# Patient Record
Sex: FEMALE | Race: WHITE | Employment: FULL TIME | URBAN - METROPOLITAN AREA
[De-identification: names, ages, dates, MRNs, and addresses within clinical notes are randomized per-mention and may not be internally consistent; named-entity substitution may affect disease eponyms.]

---

## 2020-02-24 ENCOUNTER — HOSPITAL ENCOUNTER (OUTPATIENT)
Dept: CT IMAGING | Age: 45
Discharge: HOME | End: 2020-02-24
Attending: INTERNAL MEDICINE | Admitting: INTERNAL MEDICINE
Payer: COMMERCIAL

## 2020-02-24 ENCOUNTER — HOSPITAL ENCOUNTER (OUTPATIENT)
Age: 45
Setting detail: OBSERVATION
Discharge: HOME | End: 2020-02-24
Attending: INTERNAL MEDICINE | Admitting: INTERNAL MEDICINE
Payer: COMMERCIAL

## 2020-02-24 VITALS
DIASTOLIC BLOOD PRESSURE: 64 MMHG | HEART RATE: 73 BPM | SYSTOLIC BLOOD PRESSURE: 111 MMHG | RESPIRATION RATE: 17 BRPM | TEMPERATURE: 98.9 F | OXYGEN SATURATION: 97 %

## 2020-02-24 DIAGNOSIS — R31.1 BENIGN ESSENTIAL MICROSCOPIC HEMATURIA: ICD-10-CM

## 2020-02-24 PROBLEM — R31.9 HEMATURIA: Status: ACTIVE | Noted: 2020-02-24

## 2020-02-24 LAB
ALBUMIN SERPL-MCNC: 3.4 G/DL (ref 3.5–5)
ANION GAP SERPL CALC-SCNC: 4 MMOL/L (ref 5–15)
APPEARANCE UR: CLEAR
BACTERIA URNS QL MICRO: ABNORMAL /HPF
BILIRUB UR QL: NEGATIVE
BUN SERPL-MCNC: 12 MG/DL (ref 6–20)
BUN/CREAT SERPL: 16 (ref 12–20)
CALCIUM SERPL-MCNC: 8.3 MG/DL (ref 8.5–10.1)
CHLORIDE SERPL-SCNC: 107 MMOL/L (ref 97–108)
CO2 SERPL-SCNC: 27 MMOL/L (ref 21–32)
COLOR UR: ABNORMAL
CREAT SERPL-MCNC: 0.77 MG/DL (ref 0.55–1.02)
EPITH CASTS URNS QL MICRO: ABNORMAL /LPF
ERYTHROCYTE [DISTWIDTH] IN BLOOD BY AUTOMATED COUNT: 11.9 % (ref 11.5–14.5)
GLUCOSE SERPL-MCNC: 89 MG/DL (ref 65–100)
GLUCOSE UR STRIP.AUTO-MCNC: NEGATIVE MG/DL
HCT VFR BLD AUTO: 35 % (ref 35–47)
HCT VFR BLD AUTO: 36.7 % (ref 35–47)
HGB BLD-MCNC: 11.8 G/DL (ref 11.5–16)
HGB BLD-MCNC: 12.4 G/DL (ref 11.5–16)
HGB UR QL STRIP: ABNORMAL
HYALINE CASTS URNS QL MICRO: ABNORMAL /LPF (ref 0–5)
INR PPP: 1.1 (ref 0.9–1.1)
KETONES UR QL STRIP.AUTO: NEGATIVE MG/DL
LEUKOCYTE ESTERASE UR QL STRIP.AUTO: NEGATIVE
MCH RBC QN AUTO: 31.5 PG (ref 26–34)
MCHC RBC AUTO-ENTMCNC: 33.7 G/DL (ref 30–36.5)
MCV RBC AUTO: 93.3 FL (ref 80–99)
NITRITE UR QL STRIP.AUTO: NEGATIVE
NRBC # BLD: 0 K/UL (ref 0–0.01)
NRBC BLD-RTO: 0 PER 100 WBC
PH UR STRIP: 7 [PH] (ref 5–8)
PHOSPHATE SERPL-MCNC: 2.3 MG/DL (ref 2.6–4.7)
PLATELET # BLD AUTO: 324 K/UL (ref 150–400)
PMV BLD AUTO: 9.1 FL (ref 8.9–12.9)
POTASSIUM SERPL-SCNC: 3.8 MMOL/L (ref 3.5–5.1)
PROT UR STRIP-MCNC: NEGATIVE MG/DL
PROTHROMBIN TIME: 11.4 SEC (ref 9–11.1)
RBC # BLD AUTO: 3.75 M/UL (ref 3.8–5.2)
RBC #/AREA URNS HPF: ABNORMAL /HPF (ref 0–5)
SODIUM SERPL-SCNC: 138 MMOL/L (ref 136–145)
SP GR UR REFRACTOMETRY: 1.02 (ref 1–1.03)
UR CULT HOLD, URHOLD: NORMAL
UROBILINOGEN UR QL STRIP.AUTO: 0.2 EU/DL (ref 0.2–1)
WBC # BLD AUTO: 5.1 K/UL (ref 3.6–11)
WBC URNS QL MICRO: ABNORMAL /HPF (ref 0–4)

## 2020-02-24 PROCEDURE — 77030020268 HC MISC GENERAL SUPPLY

## 2020-02-24 PROCEDURE — 74011250637 HC RX REV CODE- 250/637: Performed by: INTERNAL MEDICINE

## 2020-02-24 PROCEDURE — 36415 COLL VENOUS BLD VENIPUNCTURE: CPT

## 2020-02-24 PROCEDURE — 85610 PROTHROMBIN TIME: CPT

## 2020-02-24 PROCEDURE — 80069 RENAL FUNCTION PANEL: CPT

## 2020-02-24 PROCEDURE — 81001 URINALYSIS AUTO W/SCOPE: CPT

## 2020-02-24 PROCEDURE — 85018 HEMOGLOBIN: CPT

## 2020-02-24 PROCEDURE — 86923 COMPATIBILITY TEST ELECTRIC: CPT

## 2020-02-24 PROCEDURE — 85027 COMPLETE CBC AUTOMATED: CPT

## 2020-02-24 PROCEDURE — 50200 RENAL BIOPSY PERQ: CPT

## 2020-02-24 PROCEDURE — 74011250636 HC RX REV CODE- 250/636: Performed by: RADIOLOGY

## 2020-02-24 PROCEDURE — 99218 HC RM OBSERVATION: CPT

## 2020-02-24 PROCEDURE — 86900 BLOOD TYPING SEROLOGIC ABO: CPT

## 2020-02-24 RX ORDER — ACETAMINOPHEN 325 MG/1
650 TABLET ORAL
Status: DISCONTINUED | OUTPATIENT
Start: 2020-02-24 | End: 2020-02-24 | Stop reason: HOSPADM

## 2020-02-24 RX ORDER — MIDAZOLAM HYDROCHLORIDE 1 MG/ML
5 INJECTION, SOLUTION INTRAMUSCULAR; INTRAVENOUS
Status: CANCELLED | OUTPATIENT
Start: 2020-02-24

## 2020-02-24 RX ORDER — SODIUM CHLORIDE 9 MG/ML
250 INJECTION, SOLUTION INTRAVENOUS AS NEEDED
Status: DISCONTINUED | OUTPATIENT
Start: 2020-02-24 | End: 2020-02-28 | Stop reason: HOSPADM

## 2020-02-24 RX ORDER — SODIUM CHLORIDE 0.9 % (FLUSH) 0.9 %
5-40 SYRINGE (ML) INJECTION AS NEEDED
Status: DISCONTINUED | OUTPATIENT
Start: 2020-02-24 | End: 2020-02-24 | Stop reason: HOSPADM

## 2020-02-24 RX ORDER — SODIUM CHLORIDE 9 MG/ML
25 INJECTION, SOLUTION INTRAVENOUS CONTINUOUS
Status: CANCELLED | OUTPATIENT
Start: 2020-02-24

## 2020-02-24 RX ORDER — MIDAZOLAM HYDROCHLORIDE 1 MG/ML
5 INJECTION, SOLUTION INTRAMUSCULAR; INTRAVENOUS
Status: DISCONTINUED | OUTPATIENT
Start: 2020-02-24 | End: 2020-02-24

## 2020-02-24 RX ORDER — FENTANYL CITRATE 50 UG/ML
100 INJECTION, SOLUTION INTRAMUSCULAR; INTRAVENOUS
Status: CANCELLED | OUTPATIENT
Start: 2020-02-24

## 2020-02-24 RX ORDER — FENTANYL CITRATE 50 UG/ML
100 INJECTION, SOLUTION INTRAMUSCULAR; INTRAVENOUS
Status: DISCONTINUED | OUTPATIENT
Start: 2020-02-24 | End: 2020-02-24

## 2020-02-24 RX ORDER — SODIUM CHLORIDE 0.9 % (FLUSH) 0.9 %
5-40 SYRINGE (ML) INJECTION EVERY 8 HOURS
Status: DISCONTINUED | OUTPATIENT
Start: 2020-02-24 | End: 2020-02-24 | Stop reason: HOSPADM

## 2020-02-24 RX ORDER — CLONIDINE HYDROCHLORIDE 0.1 MG/1
0.1 TABLET ORAL AS NEEDED
Status: DISCONTINUED | OUTPATIENT
Start: 2020-02-24 | End: 2020-02-24 | Stop reason: HOSPADM

## 2020-02-24 RX ORDER — SODIUM CHLORIDE 9 MG/ML
25 INJECTION, SOLUTION INTRAVENOUS CONTINUOUS
Status: DISCONTINUED | OUTPATIENT
Start: 2020-02-24 | End: 2020-02-24

## 2020-02-24 RX ADMIN — MIDAZOLAM 1 MG: 1 INJECTION INTRAMUSCULAR; INTRAVENOUS at 12:51

## 2020-02-24 RX ADMIN — ACETAMINOPHEN 650 MG: 325 TABLET ORAL at 17:14

## 2020-02-24 RX ADMIN — FENTANYL CITRATE 25 MCG: 50 INJECTION, SOLUTION INTRAMUSCULAR; INTRAVENOUS at 12:51

## 2020-02-24 RX ADMIN — MIDAZOLAM 1 MG: 1 INJECTION INTRAMUSCULAR; INTRAVENOUS at 12:43

## 2020-02-24 RX ADMIN — FENTANYL CITRATE 25 MCG: 50 INJECTION, SOLUTION INTRAMUSCULAR; INTRAVENOUS at 12:43

## 2020-02-24 RX ADMIN — SODIUM CHLORIDE 25 ML/HR: 900 INJECTION, SOLUTION INTRAVENOUS at 12:29

## 2020-02-24 NOTE — H&P
Interventional Radiology History and Physical (Outpatient)    2/24/2020    Patient: Delfin Barthel 40 y.o. female     Referring Physician:  Chacha Knapp MD    Chief Complaint: presents for CT guided random renal biopsy    History of Present Illness: hematuria    History:  No past medical history on file. No family history on file. Social History     Socioeconomic History    Marital status:      Spouse name: Not on file    Number of children: Not on file    Years of education: Not on file    Highest education level: Not on file   Occupational History    Not on file   Social Needs    Financial resource strain: Not on file    Food insecurity:     Worry: Not on file     Inability: Not on file    Transportation needs:     Medical: Not on file     Non-medical: Not on file   Tobacco Use    Smoking status: Not on file   Substance and Sexual Activity    Alcohol use: Not on file    Drug use: Not on file    Sexual activity: Not on file   Lifestyle    Physical activity:     Days per week: Not on file     Minutes per session: Not on file    Stress: Not on file   Relationships    Social connections:     Talks on phone: Not on file     Gets together: Not on file     Attends Jehovah's witness service: Not on file     Active member of club or organization: Not on file     Attends meetings of clubs or organizations: Not on file     Relationship status: Not on file    Intimate partner violence:     Fear of current or ex partner: Not on file     Emotionally abused: Not on file     Physically abused: Not on file     Forced sexual activity: Not on file   Other Topics Concern    Not on file   Social History Narrative    Not on file       Allergies:  Allergies not on file    Prior to Admission Medications:  Prior to Admission medications    Not on File       Physical Exam:    General: alert, cooperative, no distress, appears stated age  Heart: normal S1 and S2  Lungs: clear to auscultation bilaterally    Plan of Care/Planned Procedure:  Risks, benefits, and alternatives reviewed with patient and she agrees to proceed with the procedure.      Bran Hercules MD

## 2020-02-24 NOTE — PROGRESS NOTES
Pt arrives via stretcher to angio department accompanied by  for CT BX procedure. All assessments completed and consent was reviewed. Education given was regarding procedure, conscious sedation, post-procedure care and  management/follow-up. Opportunity for questions was provided and all questions and concerns were addressed. Pt educated on moderate sedation and its purpose; medications and side effects described and patient verbalized understanding. Reviewed sedation plan to include medicating under conscious sedation using versed and fentanyl IV. Reviewed potential side effects with patient and possible interactions with patient's current meds. 1225 pm- Dr. Carmenza Singh in to talk with patient about procedure. Patient evaluated and assessed for procedure by Dr. Isai Norris. Consent signed.

## 2020-02-24 NOTE — DISCHARGE INSTRUCTIONS
Patient Education        Needle Kidney Biopsy: What to Expect at Home  Your Recovery    After a needle kidney biopsy, you will be told to lie down on your back for several hours. After this, you should avoid strenuous activity for the next 2 to 3 days. It's normal to feel some soreness in the area of the biopsy for 2 to 3 days. You may have a small amount of bleeding on the bandage after the biopsy. You may notice some blood in your urine after the biopsy. This should go away within 12 to 24 hours. If it doesn't, call your doctor. If you are feeling well, you should be able to get back to work within a week. But avoid strenuous activity, such as heavy lifting, for up to another week. This care sheet gives you a general idea about how long it will take for you to recover. But each person recovers at a different pace. Follow the steps below to feel better as quickly as possible. How can you care for yourself at home? Activity    · Avoid lifting anything that would make you strain. This may include heavy grocery bags and milk containers, a heavy briefcase or backpack, cat litter or dog food bags, a vacuum , or a child.     · Avoid strenuous activities, such as bicycle riding, jogging, weight lifting, or aerobic exercise, until your doctor says it is okay.     · Try to walk each day. Start by walking a little more than you did the day before. Bit by bit, increase the amount you walk. Walking boosts blood flow and helps prevent pneumonia and constipation.     · Rest when you feel tired. Getting enough sleep will help you recover.     · Ask your doctor when it is okay for you to have sex. Diet    · You can eat your normal diet. If your stomach is upset, try bland, low-fat foods like plain rice, broiled chicken, toast, and yogurt.     · Drink plenty of fluids to avoid becoming dehydrated. Medicines    · Your doctor will tell you if and when you can restart your medicines.  He or she will also give you instructions about taking any new medicines.     · If you take blood thinners, such as warfarin (Coumadin), clopidogrel (Plavix), or aspirin, be sure to talk to your doctor. He or she will tell you if and when to start taking those medicines again. Make sure that you understand exactly what your doctor wants you to do.     · Do not take aspirin or anti-inflammatory medicines for a week after the biopsy. Incision care    · Keep a bandage over the puncture site for the first 1 or 2 days. Follow-up care is a key part of your treatment and safety. Be sure to make and go to all appointments, and call your doctor if you are having problems. It's also a good idea to know your test results and keep a list of the medicines you take. When should you call for help? Call 911 anytime you think you may need emergency care. For example, call if:    · You passed out (lost consciousness).    Call your doctor now or seek immediate medical care if:    · You have signs of infection, such as:  ? Increased pain, swelling, warmth, or redness. ? Red streaks leading from the puncture site. ? Pus draining from the puncture site. ? A fever.     · Bright red blood has soaked through the bandage over the puncture site.     · You have new or worse pain at the puncture site.    Watch closely for any changes in your health, and call your doctor if:    · You have blood in your urine for more than 1 day. Where can you learn more? Go to http://thong-yamilex.info/. Enter S675 in the search box to learn more about \"Needle Kidney Biopsy: What to Expect at Home. \"  Current as of: October 31, 2018  Content Version: 12.2  © 5335-2995 [employer], [employer]. Care instructions adapted under license by [employer] (which disclaims liability or warranty for this information).  If you have questions about a medical condition or this instruction, always ask your healthcare professional. [address]  any warranty or liability for your use of this information.

## 2020-02-24 NOTE — PROGRESS NOTES
10:23 AM  Patient took home effexor dose with MD verbal permission, patient advised that we would need to have it verified by pharmacy if she were to need another dose later on.

## 2020-02-24 NOTE — PROGRESS NOTES
TRANSFER - OUT REPORT:    Verbal report given to FLORI Ramos(name) on Tino Solano  being transferred to [facility](unit) for routine progression of care       Report consisted of patients Situation, Background, Assessment and   Recommendations(SBAR). Information from the following report(s) Procedure Summary and MAR was reviewed with the receiving nurse. Lines:   Peripheral IV 02/24/20 Right Wrist (Active)        Opportunity for questions and clarification was provided. Observe left posterior dressing (band aid) to left Renal biopsy for any bleeding or oozing. Procedure ended 5867  pm. Floor nurse to resume VS every 20 minutes x 3, then may go to hourly VS checks.

## 2020-02-24 NOTE — H&P
NEPHROLOGY H&P     Patient: Dione Serrato MRN: [id number]  PCP: None   :     1975  Age:   40 y.o. Sex:  female           ASSESSMENT and PLAN :   Impression:  Microscopic Hematuria: differential includes TBM disease vs IgA nephropathy  HTN  Anxiety      Plan:  Renal bx today  Clonidine PRN for SBP > 150  H&H 6 hrs post op  Home post biopsy if stable     Active Problems / Assessment AAActive  : Active Problems:    Hematuria (2020)         Subjective:   HPI: Dione Serrato is a 40 y.o.  female who has been admitted to the hospital for an elective renal biopsy. She was referred to me for microscopic hematuria. She had a neg urology w/u including imaging and cysto. She had a negative serologic w/u with me including neg JULIA, complements, ANCAs, antiGBM. No proteinuria, only isolated hematuria on her urinalysis. She is feeling ok. No cp, sob, n/v/d reported at this time. Past Medical Hx:   No past medical history on file. Past Surgical Hx:   No past surgical history on file. Medications:  Prior to Admission medications    Not on File       Allergies not on file    Social Hx:       No family history on file. Review of Systems:  A twelve point review of system was performed today. Pertinent positives and negatives are mentioned in the HPI. The reminder of the ROS is negative and noncontributory. Objective:    Vitals:    Vitals:    20 0852   BP: 121/77   Pulse: (!) 58   Resp: 17   Temp: 98 °F (36.7 °C)   SpO2: 98%     I&O's:  No intake/output data recorded. Visit Vitals  /77   Pulse (!) 58   Temp 98 °F (36.7 °C)   Resp 17   SpO2 98%       Physical Exam:  General:Alert, No distress,   HEENT: Eyes are PERRL. Conjunctiva without pallor ,erythema. The sclerae without icterus.  .   Neck:Supple,no mass palpable  Lungs : Clears to auscultation Bilaterally, Normal respiratory effort  CVS: RRR, S1 S2 normal, No rub,  no LE edema  Abdomen: Soft, Non tender, No hepatosplenomegaly, bowel sounds present  Extremities: No cyanosis, No clubbing  Skin: No rash or lesions. Lymph nodes: No palpable nodes  MS: No joint swelling, erythema, warmth  Neurologic: non focal, AAO x 3  Psych: normal affect    Laboratory Results:    No results found for: BUN, NA, K, CL, CO2    No results found for: BUN, K    No results found for: WBC, RBC, HGB, HCT, MCV, MCH, RDW, PLT, HGBEXT, HCTEXT, PLTEXT    No results found for: PTH, PHOS    Urine dipstick:   No results found for: COLOR, APPRN, SPGRU, REFSG, LUCRECIA, PROTU, GLUCU, KETU, BILU, UROU, CHANA, LEUKU, [facility], WBCU, RBCU, CASTSAisha MD  2/24/2020    [address]  Phone - [phone]   Fax - [phone]  [email or web address]

## 2020-02-25 LAB
ABO + RH BLD: NORMAL
BLD PROD TYP BPU: NORMAL
BLD PROD TYP BPU: NORMAL
BLOOD GROUP ANTIBODIES SERPL: NORMAL
BPU ID: NORMAL
BPU ID: NORMAL
CROSSMATCH RESULT,%XM: NORMAL
CROSSMATCH RESULT,%XM: NORMAL
SPECIMEN EXP DATE BLD: NORMAL
STATUS OF UNIT,%ST: NORMAL
STATUS OF UNIT,%ST: NORMAL
UNIT DIVISION, %UDIV: 0
UNIT DIVISION, %UDIV: 0

## 2020-02-25 NOTE — PROGRESS NOTES
Discharge instructions reviewed with patient and family, they had no further questions at this time. Repeat lab results pending.

## 2020-09-30 ENCOUNTER — TRANSCRIBE ORDER (OUTPATIENT)
Dept: FAMILY MEDICINE CLINIC | Age: 45
End: 2020-09-30

## 2020-10-19 ENCOUNTER — OFFICE VISIT (OUTPATIENT)
Dept: INTERNAL MEDICINE CLINIC | Age: 45
End: 2020-10-19
Payer: COMMERCIAL

## 2020-10-19 VITALS
WEIGHT: 151.4 LBS | SYSTOLIC BLOOD PRESSURE: 115 MMHG | HEART RATE: 68 BPM | RESPIRATION RATE: 16 BRPM | HEIGHT: 65 IN | OXYGEN SATURATION: 99 % | DIASTOLIC BLOOD PRESSURE: 86 MMHG | BODY MASS INDEX: 25.22 KG/M2 | TEMPERATURE: 95 F

## 2020-10-19 DIAGNOSIS — N39.0 FREQUENT UTI: ICD-10-CM

## 2020-10-19 DIAGNOSIS — R31.9 HEMATURIA, UNSPECIFIED TYPE: ICD-10-CM

## 2020-10-19 DIAGNOSIS — F43.21 GRIEF: Primary | ICD-10-CM

## 2020-10-19 PROCEDURE — 99203 OFFICE O/P NEW LOW 30 MIN: CPT | Performed by: PHYSICIAN ASSISTANT

## 2020-10-19 RX ORDER — LEVONORGESTREL 52 MG/1
INTRAUTERINE DEVICE INTRAUTERINE
COMMUNITY
Start: 2014-03-13

## 2020-10-19 RX ORDER — BUPROPION HYDROCHLORIDE 150 MG/1
TABLET, EXTENDED RELEASE ORAL
COMMUNITY

## 2020-10-19 RX ORDER — VENLAFAXINE HYDROCHLORIDE 150 MG/1
150 CAPSULE, EXTENDED RELEASE ORAL
COMMUNITY

## 2020-10-19 RX ORDER — LAMOTRIGINE 200 MG/1
TABLET ORAL 2 TIMES DAILY
COMMUNITY
Start: 2019-08-06

## 2020-10-19 NOTE — PROGRESS NOTES
Siria Rubi is a 40 y.o. female  Chief Complaint   Patient presents with   [address]     no other concerns for today      Visit Vitals  /86 (BP 1 Location: Right arm, BP Patient Position: Sitting)   Pulse 68   Temp (!) 95 °F (35 °C) (Temporal)   Resp 16   Ht 5' 5\" (1.651 m)   Wt 151 lb 6.4 oz (68.7 kg)   SpO2 99%   BMI 25.19 kg/m²      Health Maintenance Due   Topic Date Due    DTaP/Tdap/Td series (1 - Tdap) 12/07/1996    PAP AKA CERVICAL CYTOLOGY  12/07/1996    Lipid Screen  12/07/2015    Flu Vaccine (1) 09/01/2020       HPI  New patient. New to [facility]. Seeing Dr. Eloy Armstrong for psychiatry - sees monthly due to Vyvanse. Has been seeing him since early 2020. Has had several psychiatrists in [place]. Has been living here since 2011. Broke up with male partner yesterday (discovered partner is alcoholic). Tearful and feeling disorganized today. In the past, saw Renetta Amaro for counseling. Planning to call her today. Reports that she has good family support. Hx frequent UTIs - last sx 1 month ago. Resolved now. Consulted with [facility] and [facility]. Had cystostocpy, bladder scan, and kidney imaging. Had kidney bx in Feb at [facility]. Reports that she was told she does not need to follow up with nephrology/urology now. ROS  Review of Systems   Respiratory: Negative for shortness of breath. Cardiovascular: Negative for chest pain and palpitations. Genitourinary: Negative for dysuria and hematuria. Neurological: Negative for dizziness, loss of consciousness and weakness. Psychiatric/Behavioral: Negative for suicidal ideas. EXAM  Physical Exam  Vitals signs and nursing note reviewed. Constitutional:       General: She is not in acute distress. Appearance: She is well-developed. HENT:      Head: Normocephalic and atraumatic. Neck:      Musculoskeletal: Neck supple. Vascular: No JVD.    Cardiovascular:      Rate and Rhythm: Normal rate and regular rhythm. Heart sounds: Normal heart sounds. Pulmonary:      Effort: Pulmonary effort is normal. No respiratory distress. Breath sounds: Normal breath sounds. Skin:     General: Skin is warm and dry. Neurological:      Mental Status: She is alert and oriented to person, place, and time. Psychiatric:         Behavior: Behavior normal.         Thought Content: Thought content normal.         Judgment: Judgment normal.      Comments: Tearful, disorganized today. ASSESSMENT/PLAN  1. Frequent UTI  Improved recently. Counseled pt that she can try taking daily Cranberry supplement for prevention. 2. Hematuria, unspecified type  Reviewed labs/path results today. 3. Grief  Encouraged pt's plan to call counselor today. Schedule CE for 2-3 months with old lab results available at time of visit.

## 2021-05-28 ENCOUNTER — OFFICE VISIT (OUTPATIENT)
Dept: INTERNAL MEDICINE CLINIC | Age: 46
End: 2021-05-28
Payer: COMMERCIAL

## 2021-05-28 VITALS
TEMPERATURE: 98.7 F | BODY MASS INDEX: 26.49 KG/M2 | WEIGHT: 159 LBS | HEIGHT: 65 IN | RESPIRATION RATE: 14 BRPM | OXYGEN SATURATION: 98 % | HEART RATE: 82 BPM | DIASTOLIC BLOOD PRESSURE: 71 MMHG | SYSTOLIC BLOOD PRESSURE: 112 MMHG

## 2021-05-28 DIAGNOSIS — M41.9 SCOLIOSIS, UNSPECIFIED SCOLIOSIS TYPE, UNSPECIFIED SPINAL REGION: ICD-10-CM

## 2021-05-28 DIAGNOSIS — G89.29 CHRONIC HIP PAIN, UNSPECIFIED LATERALITY: Primary | ICD-10-CM

## 2021-05-28 DIAGNOSIS — M54.50 CHRONIC LOW BACK PAIN, UNSPECIFIED BACK PAIN LATERALITY, UNSPECIFIED WHETHER SCIATICA PRESENT: ICD-10-CM

## 2021-05-28 DIAGNOSIS — Z84.89: ICD-10-CM

## 2021-05-28 DIAGNOSIS — M25.559 CHRONIC HIP PAIN, UNSPECIFIED LATERALITY: Primary | ICD-10-CM

## 2021-05-28 DIAGNOSIS — G89.29 CHRONIC LOW BACK PAIN, UNSPECIFIED BACK PAIN LATERALITY, UNSPECIFIED WHETHER SCIATICA PRESENT: ICD-10-CM

## 2021-05-28 DIAGNOSIS — F98.8 ATTENTION DEFICIT DISORDER, UNSPECIFIED HYPERACTIVITY PRESENCE: ICD-10-CM

## 2021-05-28 PROCEDURE — 99213 OFFICE O/P EST LOW 20 MIN: CPT | Performed by: PHYSICIAN ASSISTANT

## 2021-05-28 NOTE — PROGRESS NOTES
Yifan Armstrong is a 39 y.o. female    Chief Complaint   Patient presents with    Scoliosis     pt has back and spine concerns     Medication Evaluation     ADD med switch        Health Maintenance Due   Topic Date Due    Hepatitis C Screening  Never done    DTaP/Tdap/Td series (1 - Tdap) Never done    PAP AKA CERVICAL CYTOLOGY  Never done    Lipid Screen  Never done       Visit Vitals  /71 (BP 1 Location: Left upper arm, BP Patient Position: Sitting)   Pulse 82   Temp 98.7 °F (37.1 °C) (Temporal)   Resp 14   Ht 5' 5\" (1.651 m)   Wt 159 lb (72.1 kg)   SpO2 98%   BMI 26.46 kg/m²       3 most recent PHQ Screens 5/28/2021   PHQ Not Done Active Diagnosis of Depression or Bipolar Disorder   Little interest or pleasure in doing things -   Feeling down, depressed, irritable, or hopeless -   Total Score PHQ 2 -       No flowsheet data found. Abuse Screening Questionnaire 10/19/2020   Do you ever feel afraid of your partner? N   Are you in a relationship with someone who physically or mentally threatens you? N   Is it safe for you to go home? Y         1. Have you been to the ER, urgent care clinic since your last visit? Hospitalized since your last visit?no    2. Have you seen or consulted any other health care providers outside of the [address] [place] [address] since your last visit? Include any pap smears or colon screening. no

## 2021-05-28 NOTE — PROGRESS NOTES
Christy Kulkarni is a 39 y.o. female  Chief Complaint   Patient presents with    Scoliosis     pt has back and spine concerns     Medication Evaluation     ADD med switch      Visit Vitals  /71 (BP 1 Location: Left upper arm, BP Patient Position: Sitting)   Pulse 82   Temp 98.7 °F (37.1 °C) (Temporal)   Resp 14   Ht 5' 5\" (1.651 m)   Wt 159 lb (72.1 kg)   SpO2 98%   BMI 26.46 kg/m²      Health Maintenance Due   Topic Date Due    Hepatitis C Screening  Never done    DTaP/Tdap/Td series (1 - Tdap) Never done    PAP AKA CERVICAL CYTOLOGY  Never done    Lipid Screen  Never done       HPI  ADD follow up - Taking Vyvanse PRN. Son uses Adderall 15 mg once daily and has found this helpful. Pt hasn't recently seen Psychiatry because Psychiatrist was/is out of town. 3/22/21 last fill of Vyvanse 50 mg #30. Not sure if Psychiatrist is still out of town. Pt plans to call him soon. Concerned about father's exposure to Agent [place] in [facility] and how this may affect her:   Scoliosis, Hip Dysplasia as an infant - pain in hips and sacrum entire life. Notes that she has a long tailbone. Pt questions if she may have Spina Bifida. ROS  Review of Systems   Respiratory: Negative for shortness of breath. Cardiovascular: Negative for chest pain and palpitations. Musculoskeletal: Positive for back pain and joint pain. Negative for falls. Neurological: Negative for dizziness, loss of consciousness and weakness. EXAM  Physical Exam  Vitals and nursing note reviewed. Constitutional:       General: She is not in acute distress. Appearance: She is well-developed. HENT:      Head: Normocephalic and atraumatic. Neck:      Vascular: No JVD. Cardiovascular:      Rate and Rhythm: Normal rate and regular rhythm. Heart sounds: Normal heart sounds. Pulmonary:      Effort: Pulmonary effort is normal. No respiratory distress. Breath sounds: Normal breath sounds.    Musculoskeletal:      Cervical back: Neck supple. Skin:     General: Skin is warm and dry. Neurological:      Mental Status: She is alert and oriented to person, place, and time. Psychiatric:         Mood and Affect: Mood normal.         Behavior: Behavior normal.         Thought Content: Thought content normal.         Judgment: Judgment normal.       ASSESSMENT/PLAN  Encounter Diagnoses     ICD-10-CM ICD-9-CM   1. Chronic hip pain, unspecified laterality  M25.559 719.45    G89.29 338.29   2. Family history of exposure to Agent Orange  Z84.89 V19.8   3. Scoliosis, unspecified scoliosis type, unspecified spinal region  M41.9 737.30   4. Chronic low back pain, unspecified back pain laterality, unspecified whether sciatica present  M54.5 724.2    G89.29 338.29   5. Attention deficit disorder, unspecified hyperactivity presence  F98.8 314.00     Orders Placed This Encounter    REFERRAL TO NEUROSURGERY     Follow up as planned with Psychiatry re: ADD.

## 2021-06-01 PROBLEM — F98.8 ATTENTION DEFICIT DISORDER, UNSPECIFIED HYPERACTIVITY PRESENCE: Status: ACTIVE | Noted: 2021-06-01

## 2021-06-01 PROBLEM — M54.50 CHRONIC LOW BACK PAIN, UNSPECIFIED BACK PAIN LATERALITY, UNSPECIFIED WHETHER SCIATICA PRESENT: Status: ACTIVE | Noted: 2021-06-01

## 2021-06-01 PROBLEM — G89.29 CHRONIC HIP PAIN, UNSPECIFIED LATERALITY: Status: ACTIVE | Noted: 2021-06-01

## 2021-06-01 PROBLEM — G89.29 CHRONIC LOW BACK PAIN, UNSPECIFIED BACK PAIN LATERALITY, UNSPECIFIED WHETHER SCIATICA PRESENT: Status: ACTIVE | Noted: 2021-06-01

## 2021-06-01 PROBLEM — M25.559 CHRONIC HIP PAIN, UNSPECIFIED LATERALITY: Status: ACTIVE | Noted: 2021-06-01

## 2021-09-29 ENCOUNTER — VIRTUAL VISIT (OUTPATIENT)
Dept: INTERNAL MEDICINE CLINIC | Age: 46
End: 2021-09-29
Payer: COMMERCIAL

## 2021-09-29 DIAGNOSIS — N02.9 THIN BASEMENT MEMBRANE NEPHROPATHY: Primary | ICD-10-CM

## 2021-09-29 DIAGNOSIS — K58.2 IRRITABLE BOWEL SYNDROME WITH BOTH CONSTIPATION AND DIARRHEA: ICD-10-CM

## 2021-09-29 DIAGNOSIS — K62.5 RECTAL BLEED: ICD-10-CM

## 2021-09-29 DIAGNOSIS — K64.9 HEMORRHOIDS, UNSPECIFIED HEMORRHOID TYPE: ICD-10-CM

## 2021-09-29 DIAGNOSIS — Z13.220 SCREENING CHOLESTEROL LEVEL: ICD-10-CM

## 2021-09-29 DIAGNOSIS — Z11.59 ENCOUNTER FOR HEPATITIS C SCREENING TEST FOR LOW RISK PATIENT: ICD-10-CM

## 2021-09-29 PROCEDURE — 99214 OFFICE O/P EST MOD 30 MIN: CPT | Performed by: PHYSICIAN ASSISTANT

## 2021-09-29 RX ORDER — HYDROCORTISONE 25 MG/G
CREAM TOPICAL 4 TIMES DAILY
Qty: 30 G | Refills: 0 | Status: SHIPPED | OUTPATIENT
Start: 2021-09-29

## 2021-09-29 NOTE — PROGRESS NOTES
1. Have you been to the ER, urgent care clinic since your last visit? Hospitalized since your last visit? No    2. Have you seen or consulted any other health care providers outside of the [address] [place] [address] since your last visit? Include any pap smears or colon screening.  No   Chief Complaint   Patient presents with    Diarrhea     bright red blood in stool     Please send link to [phone]

## 2021-09-29 NOTE — Clinical Note
Please request colonoscopy report: Dr. Tad Braxton. Please request last 1 year of notes and labs: Dr. Bing Rose (Nephrology)   Please fax my notes from this summer (excluding today) + referral order placed this summer to Dr. Oumou Bassett.

## 2021-09-29 NOTE — PROGRESS NOTES
Pina Elizabeth is a 39 y.o. female who was seen by synchronous (real-time) audio-video technology on 9/29/2021    Consent: Pina Elizabeth, who was seen by synchronous (real-time) audio-video technology, and/or her healthcare decision maker, is aware that this patient-initiated, Telehealth encounter on 9/29/2021 is a billable service, with coverage as determined by her insurance carrier. She is aware that she may receive a bill and has provided verbal consent to proceed: YES  Subjective:   Pina Elizabeth is a 39 y.o. female who was seen for Diarrhea (bright red blood in stool)    Hx IBS with diarrhea & constipation. Avoiding dairy, gluten, certain fruits x years, which has helped, but now having diarrhea daily with bright red blood in stool x 3 days - hx internal hemorrhoids & anal fissure. 2 days ago notes that had large thin membrane in the toilet. Vomited on Friday but pt believes this was related to PND cough. Last colonoscopy & endoscopy 2 years ago per patient. Lakshmi Trinh. Seeing Nephrology next week: Dr. Belen Cosby  2 weeks ago - tested CBC, Protein, Creat, EP+6AC renal panel, UA. Health Maintenance Due   Topic Date Due    Hepatitis C Screening  Never done    DTaP/Tdap/Td series (1 - Tdap) Never done    Cervical cancer screen  Never done    Lipid Screen  Never done    Colorectal Cancer Screening Combo  Never done    COVID-19 Vaccine (2 - Pfizer 2-dose series) 06/02/2021    Flu Vaccine (1) 09/01/2021     Review of Systems   Respiratory: Negative for shortness of breath. Cardiovascular: Negative for chest pain and palpitations. Gastrointestinal: Positive for blood in stool and diarrhea. Negative for abdominal pain, constipation, melena, nausea and vomiting. Neurological: Negative for dizziness, loss of consciousness and weakness. Objective:   No flowsheet data found.      General: alert, cooperative, no distress   Mental  status: normal mood, behavior, speech, dress, motor activity, and thought processes, able to follow commands   HENT: NCAT   Neck: no visualized mass   Resp: no respiratory distress   Neuro: no gross deficits   Skin: no discoloration or lesions of concern on visible areas   Psychiatric: normal affect, consistent with stated mood, no evidence of hallucinations     Assessment & Plan:     Encounter Diagnoses     ICD-10-CM ICD-9-CM   1. Thin basement membrane nephropathy  N02.9 583.9   2. Irritable bowel syndrome with both constipation and diarrhea  K58.2 564.1   3. Hemorrhoids, unspecified hemorrhoid type  K64.9 455.6   4. Rectal bleed  K62.5 569.3   5. Screening cholesterol level  Z13.220 V77.91   6. Encounter for hepatitis C screening test for low risk patient  Z11.59 V73.89     Orders Placed This Encounter    CBC W/O DIFF    IRON PROFILE    LIPID PANEL    HEPATITIS C AB    hydrocortisone (ANUSOL-HC) 2.5 % rectal cream   Continue routine follow ups with Nephrology. If bleeding persists in 1-2 weeks or worsens, instructed pt to follow up with GI. We discussed the expected course, resolution and complications of the diagnosis(es) in detail. Medication risks, benefits, costs, interactions, and alternatives were discussed as indicated. I advised her to contact the office if her condition worsens, changes or fails to improve as anticipated. She expressed understanding with the diagnosis(es) and plan. Dione Valladares is a 39 y.o. female who was evaluated by a video visit encounter for concerns as above. Patient identification was verified prior to start of the visit. A caregiver was present when appropriate. Due to this being a TeleHealth encounter (During XCXVI-11 public health emergency), evaluation of the following organ systems was limited: Vitals/Constitutional/EENT/Resp/CV/GI//MS/Neuro/Skin/Heme-Lymph-Imm.   Pursuant to the emergency declaration under the [address] [facility] [address] waiver authority and the [employer] Act, this Virtual  Visit was conducted, with patient's (and/or legal guardian's) consent, to reduce the patient's risk of exposure to COVID-19 and provide necessary medical care. Services were provided through a video synchronous discussion virtually to substitute for in-person clinic visit. Patient and provider were located at their individual homes.       Camille Capellan PA-C

## 2021-10-01 LAB
CHOLEST SERPL-MCNC: 222 MG/DL (ref 100–199)
ERYTHROCYTE [DISTWIDTH] IN BLOOD BY AUTOMATED COUNT: 12.4 % (ref 11.7–15.4)
HCT VFR BLD AUTO: 38.2 % (ref 34–46.6)
HCV AB S/CO SERPL IA: <0.1 S/CO RATIO (ref 0–0.9)
HDLC SERPL-MCNC: 83 MG/DL
HGB BLD-MCNC: 12.8 G/DL (ref 11.1–15.9)
IMP & REVIEW OF LAB RESULTS: NORMAL
IRON SATN MFR SERPL: 22 % (ref 15–55)
IRON SERPL-MCNC: 66 UG/DL (ref 27–159)
LDLC SERPL CALC-MCNC: 124 MG/DL (ref 0–99)
MCH RBC QN AUTO: 30.3 PG (ref 26.6–33)
MCHC RBC AUTO-ENTMCNC: 33.5 G/DL (ref 31.5–35.7)
MCV RBC AUTO: 90 FL (ref 79–97)
PLATELET # BLD AUTO: 412 X10E3/UL (ref 150–450)
RBC # BLD AUTO: 4.23 X10E6/UL (ref 3.77–5.28)
TIBC SERPL-MCNC: 306 UG/DL (ref 250–450)
TRIGL SERPL-MCNC: 85 MG/DL (ref 0–149)
UIBC SERPL-MCNC: 240 UG/DL (ref 131–425)
VLDLC SERPL CALC-MCNC: 15 MG/DL (ref 5–40)
WBC # BLD AUTO: 5.7 X10E3/UL (ref 3.4–10.8)

## 2022-01-04 ENCOUNTER — NURSE TRIAGE (OUTPATIENT)
Dept: OTHER | Facility: CLINIC | Age: 47
End: 2022-01-04

## 2022-01-04 ENCOUNTER — VIRTUAL VISIT (OUTPATIENT)
Dept: INTERNAL MEDICINE CLINIC | Age: 47
End: 2022-01-04
Payer: COMMERCIAL

## 2022-01-04 DIAGNOSIS — N02.9 THIN BASEMENT MEMBRANE NEPHROPATHY: ICD-10-CM

## 2022-01-04 DIAGNOSIS — J06.9 UPPER RESPIRATORY TRACT INFECTION, UNSPECIFIED TYPE: Primary | ICD-10-CM

## 2022-01-04 PROCEDURE — 99214 OFFICE O/P EST MOD 30 MIN: CPT | Performed by: PHYSICIAN ASSISTANT

## 2022-01-04 NOTE — PROGRESS NOTES
Yulisa Cool is a 55 y.o. female who was seen by synchronous (real-time) audio-video technology on 1/4/2022    Consent: Yulisa Cool, who was seen by synchronous (real-time) audio-video technology, and/or her healthcare decision maker, is aware that this patient-initiated, Telehealth encounter on 1/4/2022 is a billable service, with coverage as determined by her insurance carrier. She is aware that she may receive a bill and has provided verbal consent to proceed: YES  Subjective:   Yulisa Cool is a 55 y.o. female who was seen for Blood in Urine and LOW BACK PAIN    Son Lauro Mejia dx with COVID on Christmas. Pt started having sx by Tuesday (fever, fatigue, chills, sweats, headache, mild cough). Sx are improving. Back pain x 4 days. Last week had bright red vaginal bleeding for a couple of days, but period wasn't due. Also had a hemorrhoid bleeding at the same time. No burning with urination, and no blood in urine. Has had chronic UTIs in the past. Doesn't feel like she has a UTI. Kidneys are not tender. No fever. Sees Nephrology for Nephropathy. Plans to call them now. Health Maintenance Due   Topic Date Due    DTaP/Tdap/Td series (1 - Tdap) Never done    Cervical cancer screen  Never done    COVID-19 Vaccine (2 - Pfizer 3-dose series) 06/02/2021    Flu Vaccine (1) 09/01/2021     Review of Systems   Respiratory: Negative for shortness of breath. Cardiovascular: Negative for chest pain and palpitations. Neurological: Negative for dizziness, loss of consciousness and weakness.       Objective:     General: alert, cooperative, no distress   Mental  status: normal mood, behavior, speech, dress, motor activity, and thought processes, able to follow commands   HENT: NCAT   Neck: no visualized mass   Resp: no respiratory distress   Neuro: no gross deficits   Skin: no discoloration or lesions of concern on visible areas   Psychiatric: normal affect, consistent with stated mood, no evidence of hallucinations     Assessment & Plan:     Encounter Diagnoses     ICD-10-CM ICD-9-CM   1. Upper respiratory tract infection, unspecified type  J06.9 465.9   2. Thin basement membrane nephropathy  N02.9 583.9     Likely COVID. Quarantine and avoid returning to work until [address] [facility] for COVID:     [email or web address]    Call Nephrologist to check on testing requirements. Mucinex, Tylenol, Cepacol, Rest, Fluids. We discussed the expected course, resolution and complications of the diagnosis(es) in detail. Medication risks, benefits, costs, interactions, and alternatives were discussed as indicated. I advised her to contact the office if her condition worsens, changes or fails to improve as anticipated. She expressed understanding with the diagnosis(es) and plan. Alton Mccauley is a 55 y.o. female who was evaluated by a video visit encounter for concerns as above. Patient identification was verified prior to start of the visit. A caregiver was present when appropriate. Due to this being a TeleHealth encounter (During IVRIK-07 public health emergency), evaluation of the following organ systems was limited: Vitals/Constitutional/EENT/Resp/CV/GI//MS/Neuro/Skin/Heme-Lymph-Imm. Pursuant to the emergency declaration under the [facility], 1135 waiver authority and the "[employer]" and [employer] General Act, this Virtual  Visit was conducted, with patient's (and/or legal guardian's) consent, to reduce the patient's risk of exposure to COVID-19 and provide necessary medical care. Services were provided through a video synchronous discussion virtually to substitute for in-person clinic visit. Patient and provider were located at their individual homes.       Annette Serna PA-C

## 2022-01-04 NOTE — TELEPHONE ENCOUNTER
Received call from Florian Matta at [facility] with Red Flag Complaint. Subjective: Caller states \"has covid and possible uti and wants urine sample to be tested\"     Current Symptoms: cough, sob but states is better, flank pain    Onset:  5 days ago; gradual    Associated Symptoms: reduced activity, reduced appetite    Pain Severity: 5/10; sharp; intermittent    Temperature: none    What has been tried: ibuprofen, nuquil    LMP: NA Pregnant: No    Recommended disposition: to be seen in er. Patient refused advised to go to er office is closed. Message sent to [facility] for future appt per patient request.     Care advice provided, patient verbalizes understanding; denies any other questions or concerns; instructed to call back for any new or worsening symptoms. ED refusal    Attention Provider: Thank you for allowing me to participate in the care of your patient. The patient was connected to triage in response to information provided to the [facility]. Please do not respond through this encounter as the response is not directed to a shared pool.         Reason for Disposition   Side (flank) or lower back pain present   MILD difficulty breathing (e.g., minimal/no SOB at rest, SOB with walking, pulse <100)    Protocols used: URINARY SYMPTOMS-ADULT-AH, COVID-19 - DIAGNOSED OR SUSPECTED-ADULT-AH    Routed to PCP

## 2022-01-04 NOTE — PROGRESS NOTES
1. Have you been to the ER, urgent care clinic since your last visit? Hospitalized since your last visit? No    2. Have you seen or consulted any other health care providers outside of the [address] [place] [address] since your last visit? Include any pap smears or colon screening.  Yes    Chief Complaint   Patient presents with    Blood in Urine    LOW BACK PAIN     Please send link to [phone]

## 2022-05-28 ENCOUNTER — TELEPHONE (OUTPATIENT)
Dept: INTERNAL MEDICINE CLINIC | Age: 47
End: 2022-05-28

## 2022-05-28 NOTE — TELEPHONE ENCOUNTER
----- Message from Susana Ya sent at 5/18/2022  9:47 AM EDT -----  Subject: Referral Request    QUESTIONS   Reason for referral request? Neurologist for her spine   Has the physician seen you for this condition before? No   Preferred Specialist (if applicable)? Do you already have an appointment scheduled? No  Additional Information for Provider? Patient can't remember the doctor's   name, please contact patient with information. Please advise  ---------------------------------------------------------------------------  --------------  CALL BACK INFO  What is the best way for the office to contact you? OK to leave message on   voicemail  Preferred Call Back Phone Number? [phone]  ---------------------------------------------------------------------------  --------------  SCRIPT ANSWERS  Relationship to Patient?  Self

## 2022-06-16 ENCOUNTER — TELEPHONE (OUTPATIENT)
Dept: INTERNAL MEDICINE CLINIC | Age: 47
End: 2022-06-16

## 2022-06-22 ENCOUNTER — OFFICE VISIT (OUTPATIENT)
Dept: INTERNAL MEDICINE CLINIC | Age: 47
End: 2022-06-22
Payer: COMMERCIAL

## 2022-06-22 VITALS
SYSTOLIC BLOOD PRESSURE: 97 MMHG | BODY MASS INDEX: 25.43 KG/M2 | RESPIRATION RATE: 18 BRPM | OXYGEN SATURATION: 99 % | DIASTOLIC BLOOD PRESSURE: 62 MMHG | WEIGHT: 152.6 LBS | HEIGHT: 65 IN | HEART RATE: 82 BPM | TEMPERATURE: 98.3 F

## 2022-06-22 DIAGNOSIS — R31.9 HEMATURIA, UNSPECIFIED TYPE: Primary | ICD-10-CM

## 2022-06-22 LAB
BILIRUB UR QL STRIP: NORMAL
GLUCOSE UR-MCNC: NEGATIVE MG/DL
KETONES P FAST UR STRIP-MCNC: NORMAL MG/DL
PH UR STRIP: 7 [PH] (ref 4.6–8)
PROT UR QL STRIP: NORMAL
SP GR UR STRIP: 1.02 (ref 1–1.03)
UA UROBILINOGEN AMB POC: NORMAL (ref 0.2–1)
URINALYSIS CLARITY POC: CLEAR
URINALYSIS COLOR POC: YELLOW
URINE BLOOD POC: NORMAL
URINE LEUKOCYTES POC: NEGATIVE
URINE NITRITES POC: NEGATIVE

## 2022-06-22 PROCEDURE — 99213 OFFICE O/P EST LOW 20 MIN: CPT | Performed by: INTERNAL MEDICINE

## 2022-06-22 PROCEDURE — 81002 URINALYSIS NONAUTO W/O SCOPE: CPT | Performed by: INTERNAL MEDICINE

## 2022-06-22 NOTE — PROGRESS NOTES
Rm    Chief Complaint   Patient presents with    Urinary Pain     blood in urine        Visit Vitals  BP 97/62 (BP 1 Location: Left upper arm, BP Patient Position: Sitting, BP Cuff Size: Adult)   Pulse 82   Temp 98.3 °F (36.8 °C) (Temporal)   Resp 18   Ht 5' 5\" (1.651 m)   Wt 152 lb 9.6 oz (69.2 kg)   SpO2 99%   BMI 25.39 kg/m²        1. Have you been to the ER, urgent care clinic since your last visit? Hospitalized since your last visit? No    2. Have you seen or consulted any other health care providers outside of the [address] [place] [address] since your last visit? Include any pap smears or colon screening. No     Health Maintenance Due   Topic Date Due    DTaP/Tdap/Td series (1 - Tdap) Never done    Cervical cancer screen  Never done    COVID-19 Vaccine (2 - Pfizer 3-dose series) 06/02/2021        3 most recent PHQ Screens 6/22/2022   PHQ Not Done -   Little interest or pleasure in doing things Not at all   Feeling down, depressed, irritable, or hopeless Not at all   Total Score PHQ 2 0        No flowsheet data found.     Learning Assessment 10/19/2020   PRIMARY LEARNER Patient   HIGHEST LEVEL OF EDUCATION - PRIMARY LEARNER  4 YEARS OF COLLEGE   BARRIERS PRIMARY LEARNER NONE   CO-LEARNER CAREGIVER No   PRIMARY LANGUAGE ENGLISH   LEARNER PREFERENCE PRIMARY DEMONSTRATION   ANSWERED BY patient   RELATIONSHIP SELF

## 2022-09-19 PROBLEM — R31.9 HEMATURIA: Status: RESOLVED | Noted: 2020-02-24 | Resolved: 2022-09-19

## 2023-09-05 NOTE — PROGRESS NOTES
Chief Complaint   Patient presents with   [address]     no other concerns for today        1. Have you been to the ER, urgent care clinic since your last visit? Hospitalized since your last visit? No    2. Have you seen or consulted any other health care providers outside of the [address] [place] [address] since your last visit? Include any pap smears or colon screening.  No Yes